# Patient Record
Sex: FEMALE | Race: WHITE | NOT HISPANIC OR LATINO | Employment: OTHER | URBAN - METROPOLITAN AREA
[De-identification: names, ages, dates, MRNs, and addresses within clinical notes are randomized per-mention and may not be internally consistent; named-entity substitution may affect disease eponyms.]

---

## 2017-09-13 PROBLEM — R06.02 SOB (SHORTNESS OF BREATH): Status: ACTIVE | Noted: 2017-09-13

## 2017-09-29 PROBLEM — I27.20 PULMONARY HTN: Status: ACTIVE | Noted: 2017-09-29

## 2017-11-29 PROBLEM — R06.02 SOB (SHORTNESS OF BREATH): Status: RESOLVED | Noted: 2017-09-13 | Resolved: 2017-11-29

## 2017-12-30 ENCOUNTER — NURSE TRIAGE (OUTPATIENT)
Dept: ADMINISTRATIVE | Facility: CLINIC | Age: 62
End: 2017-12-30

## 2017-12-31 NOTE — TELEPHONE ENCOUNTER
"    Reason for Disposition   General information question, no triage required and triager able to answer question    Answer Assessment - Initial Assessment Questions  1. REASON FOR CALL or QUESTION: "What is your reason for calling today?" or "How can I best help you?" or "What question do you have that I can help answer?"      Patient needs help with the c pap machine. It's not working and the customer service number transferred her to [facility] on Call. Advised patient to call the customer service number indicated on the machine. She verbalized understanding.    Protocols used: ST INFORMATION ONLY CALL-A-AH      "

## 2018-01-02 PROBLEM — I27.20 PULMONARY HTN: Status: RESOLVED | Noted: 2017-09-29 | Resolved: 2018-01-02

## 2018-07-02 PROBLEM — K29.70 GASTRITIS: Status: ACTIVE | Noted: 2017-12-21

## 2018-10-05 PROBLEM — R00.2 PALPITATIONS: Status: ACTIVE | Noted: 2018-10-05

## 2018-12-07 PROBLEM — R13.10 DYSPHAGIA: Status: ACTIVE | Noted: 2018-12-07

## 2021-02-04 PROBLEM — I63.9 CVA (CEREBRAL VASCULAR ACCIDENT): Status: ACTIVE | Noted: 2021-02-04

## 2021-02-05 PROBLEM — G51.31 HEMIFACIAL SPASM OF RIGHT SIDE OF FACE: Status: ACTIVE | Noted: 2021-02-05

## 2021-02-05 PROBLEM — I63.9 CVA (CEREBRAL VASCULAR ACCIDENT): Status: RESOLVED | Noted: 2021-02-04 | Resolved: 2021-02-05

## 2021-07-16 ENCOUNTER — TELEPHONE (OUTPATIENT)
Dept: NEUROSURGERY | Facility: CLINIC | Age: 66
End: 2021-07-16

## 2021-08-19 ENCOUNTER — CLINICAL SUPPORT (OUTPATIENT)
Dept: SMOKING CESSATION | Facility: CLINIC | Age: 66
End: 2021-08-19
Payer: COMMERCIAL

## 2021-08-19 DIAGNOSIS — F17.200 NICOTINE DEPENDENCE: Primary | ICD-10-CM

## 2021-08-19 PROCEDURE — 99404 PREV MED CNSL INDIV APPRX 60: CPT | Mod: S$GLB,,, | Performed by: GENERAL PRACTICE

## 2021-08-19 PROCEDURE — 99404 PR PREVENT COUNSEL,INDIV,60 MIN: ICD-10-PCS | Mod: S$GLB,,, | Performed by: GENERAL PRACTICE

## 2021-08-19 PROCEDURE — 99999 PR PBB SHADOW E&M-EST. PATIENT-LVL II: ICD-10-PCS | Mod: PBBFAC,,,

## 2021-08-19 PROCEDURE — 99999 PR PBB SHADOW E&M-EST. PATIENT-LVL II: CPT | Mod: PBBFAC,,,

## 2021-08-19 RX ORDER — IBUPROFEN 200 MG
1 TABLET ORAL DAILY
Qty: 28 PATCH | Refills: 0 | Status: SHIPPED | OUTPATIENT
Start: 2021-08-19 | End: 2022-07-28

## 2021-08-19 RX ORDER — DIPHENHYDRAMINE HCL 25 MG
CAPSULE ORAL
Qty: 100 EACH | Refills: 0 | Status: SHIPPED | OUTPATIENT
Start: 2021-08-19 | End: 2022-07-28

## 2021-09-09 ENCOUNTER — CLINICAL SUPPORT (OUTPATIENT)
Dept: SMOKING CESSATION | Facility: CLINIC | Age: 66
End: 2021-09-09
Payer: COMMERCIAL

## 2021-09-09 DIAGNOSIS — F17.200 NICOTINE DEPENDENCE: Primary | ICD-10-CM

## 2021-09-09 PROCEDURE — 99999 PR PBB SHADOW E&M-EST. PATIENT-LVL III: CPT | Mod: PBBFAC,,,

## 2021-09-09 PROCEDURE — 99402 PR PREVENT COUNSEL,INDIV,30 MIN: ICD-10-PCS | Mod: S$GLB,,, | Performed by: GENERAL PRACTICE

## 2021-09-09 PROCEDURE — 99402 PREV MED CNSL INDIV APPRX 30: CPT | Mod: S$GLB,,, | Performed by: GENERAL PRACTICE

## 2021-09-09 PROCEDURE — 99999 PR PBB SHADOW E&M-EST. PATIENT-LVL III: ICD-10-PCS | Mod: PBBFAC,,,

## 2021-09-23 ENCOUNTER — TELEPHONE (OUTPATIENT)
Dept: SMOKING CESSATION | Facility: CLINIC | Age: 66
End: 2021-09-23

## 2021-09-27 ENCOUNTER — TELEPHONE (OUTPATIENT)
Dept: SMOKING CESSATION | Facility: CLINIC | Age: 66
End: 2021-09-27

## 2021-10-05 ENCOUNTER — TELEPHONE (OUTPATIENT)
Dept: SMOKING CESSATION | Facility: CLINIC | Age: 66
End: 2021-10-05

## 2022-02-01 PROBLEM — I51.89 DIASTOLIC DYSFUNCTION: Status: ACTIVE | Noted: 2022-02-01

## 2022-06-28 PROBLEM — D69.2 OTHER NONTHROMBOCYTOPENIC PURPURA: Status: ACTIVE | Noted: 2022-06-28

## 2022-07-28 PROBLEM — J20.8 ACUTE BRONCHITIS DUE TO OTHER SPECIFIED ORGANISMS: Status: ACTIVE | Noted: 2022-07-28

## 2022-08-17 ENCOUNTER — CLINICAL SUPPORT (OUTPATIENT)
Dept: SMOKING CESSATION | Facility: CLINIC | Age: 67
End: 2022-08-17
Payer: COMMERCIAL

## 2022-08-17 DIAGNOSIS — F17.200 NICOTINE DEPENDENCE: Primary | ICD-10-CM

## 2022-08-17 PROCEDURE — 99407 PR TOBACCO USE CESSATION INTENSIVE >10 MINUTES: ICD-10-PCS | Mod: S$GLB,,,

## 2022-08-17 PROCEDURE — 99407 BEHAV CHNG SMOKING > 10 MIN: CPT | Mod: S$GLB,,,

## 2022-08-17 NOTE — PROGRESS NOTES
Spoke with patient today in regard to smoking cessation progress for 12 month phone follow up on quit 1. Patient not tobacco free at this time, but states she is smoking about 7 cpd.  Virtual appointments were discussed and help line number was given to help with instructions. Patient has scheduled an appointment to return to the program for Quit attempt #2. Informed patient of benefit period, future follow ups, and contact information if any further help or support is needed. Will complete smart form and resolve Quit attempt #1.

## 2022-08-31 ENCOUNTER — TELEPHONE (OUTPATIENT)
Dept: SMOKING CESSATION | Facility: CLINIC | Age: 67
End: 2022-08-31
Payer: MEDICARE

## 2022-08-31 NOTE — TELEPHONE ENCOUNTER
I called patient as she was no show for virtual tobacco cessation appointment but had to leave a message.

## 2022-09-20 ENCOUNTER — TELEPHONE (OUTPATIENT)
Dept: SMOKING CESSATION | Facility: CLINIC | Age: 67
End: 2022-09-20
Payer: MEDICARE

## 2022-09-28 ENCOUNTER — TELEPHONE (OUTPATIENT)
Dept: SMOKING CESSATION | Facility: CLINIC | Age: 67
End: 2022-09-28
Payer: MEDICARE

## 2022-10-05 PROBLEM — R09.89 CHEST CONGESTION: Status: ACTIVE | Noted: 2022-10-05

## 2022-10-05 PROBLEM — J20.9 ACUTE BRONCHITIS: Status: ACTIVE | Noted: 2022-10-05

## 2022-10-05 PROBLEM — J20.8 ACUTE BRONCHITIS DUE TO OTHER SPECIFIED ORGANISMS: Status: RESOLVED | Noted: 2022-07-28 | Resolved: 2022-10-05

## 2022-11-25 PROBLEM — K57.30 SIGMOID DIVERTICULOSIS: Status: ACTIVE | Noted: 2022-11-25

## 2022-11-25 PROBLEM — J20.9 ACUTE BRONCHITIS: Status: RESOLVED | Noted: 2022-10-05 | Resolved: 2022-11-25

## 2022-11-25 PROBLEM — U07.1 COVID-19 VIRUS INFECTION: Status: ACTIVE | Noted: 2022-11-25

## 2022-11-25 PROBLEM — Z86.010 HISTORY OF ADENOMATOUS POLYP OF COLON: Status: ACTIVE | Noted: 2022-11-25

## 2022-11-25 PROBLEM — E66.01 MORBID OBESITY WITH BMI OF 40.0-44.9, ADULT: Status: ACTIVE | Noted: 2022-11-25

## 2023-01-05 PROBLEM — E66.01 MORBID OBESITY WITH BMI OF 40.0-44.9, ADULT: Status: RESOLVED | Noted: 2022-11-25 | Resolved: 2023-01-05

## 2023-01-05 PROBLEM — E11.65 TYPE 2 DIABETES MELLITUS WITH HYPERGLYCEMIA, WITHOUT LONG-TERM CURRENT USE OF INSULIN: Status: ACTIVE | Noted: 2023-01-05

## 2023-01-05 PROBLEM — R09.89 CHEST CONGESTION: Status: RESOLVED | Noted: 2022-10-05 | Resolved: 2023-01-05

## 2023-01-05 PROBLEM — U07.1 COVID-19 VIRUS INFECTION: Status: RESOLVED | Noted: 2022-11-25 | Resolved: 2023-01-05

## 2023-01-05 PROBLEM — Z86.16 HISTORY OF 2019 NOVEL CORONAVIRUS DISEASE (COVID-19): Status: ACTIVE | Noted: 2023-01-05

## 2023-01-05 PROBLEM — E66.01 CLASS 2 SEVERE OBESITY DUE TO EXCESS CALORIES WITH SERIOUS COMORBIDITY IN ADULT: Status: ACTIVE | Noted: 2023-01-05

## 2023-01-05 PROBLEM — I67.2 ARTERIOSCLEROTIC CEREBROVASCULAR DISEASE: Status: ACTIVE | Noted: 2023-01-05

## 2023-01-05 PROBLEM — R41.3 SHORT-TERM MEMORY LOSS: Status: ACTIVE | Noted: 2023-01-05

## 2023-05-24 PROBLEM — E66.01 MORBID OBESITY WITH BMI OF 40.0-44.9, ADULT: Status: ACTIVE | Noted: 2023-05-24

## 2023-05-24 PROBLEM — E66.01 CLASS 2 SEVERE OBESITY DUE TO EXCESS CALORIES WITH SERIOUS COMORBIDITY IN ADULT: Status: RESOLVED | Noted: 2023-01-05 | Resolved: 2023-05-24

## 2023-05-24 PROBLEM — J20.9 ACUTE BRONCHITIS: Status: ACTIVE | Noted: 2023-05-24

## 2023-05-24 PROBLEM — R05.9 COUGH: Status: ACTIVE | Noted: 2023-05-24

## 2023-08-03 PROBLEM — M79.602 LEFT ARM PAIN: Status: ACTIVE | Noted: 2023-08-03

## 2023-08-03 PROBLEM — J02.9 SORE THROAT: Status: ACTIVE | Noted: 2023-08-03

## 2023-09-06 ENCOUNTER — TELEPHONE (OUTPATIENT)
Dept: PSYCHIATRY | Facility: CLINIC | Age: 68
End: 2023-09-06
Payer: MEDICARE

## 2023-09-06 NOTE — TELEPHONE ENCOUNTER
Lvm to get patient scheduled for NP appt with DR. LOZADA.     (Dr. Lozada agreed to see this patient for med man)     Available dates to offer as of right now (kevin approved these dates and times)   9/13 at 9 am or 1 pm   9/14 at 9 am   9/18 at 9 am   9/19 at 9 am or 11 am

## 2023-10-02 PROBLEM — M75.22 TENDINITIS OF LONG HEAD OF BICEPS BRACHII OF LEFT SHOULDER: Status: ACTIVE | Noted: 2023-10-02

## 2023-10-02 PROBLEM — M54.12 CERVICAL RADICULOPATHY: Status: ACTIVE | Noted: 2023-10-02

## 2023-10-19 PROBLEM — I50.32 CHRONIC DIASTOLIC HEART FAILURE: Status: ACTIVE | Noted: 2022-02-01

## 2023-10-19 PROBLEM — Z71.89 ACP (ADVANCE CARE PLANNING): Status: ACTIVE | Noted: 2023-10-19

## 2023-10-19 PROBLEM — L03.213 PRESEPTAL CELLULITIS: Status: ACTIVE | Noted: 2023-10-19

## 2023-10-19 PROBLEM — E66.2 CLASS 3 OBESITY WITH ALVEOLAR HYPOVENTILATION WITHOUT SERIOUS COMORBIDITY WITH BODY MASS INDEX (BMI) OF 40.0 TO 44.9 IN ADULT: Status: ACTIVE | Noted: 2023-10-19

## 2023-10-20 PROBLEM — E04.1 THYROID NODULE: Status: ACTIVE | Noted: 2023-10-20

## 2023-10-22 PROBLEM — J18.9 RIGHT MIDDLE LOBE PNEUMONIA: Status: ACTIVE | Noted: 2023-10-22

## 2023-10-23 PROBLEM — L03.213 PERIORBITAL CELLULITIS OF LEFT EYE: Status: ACTIVE | Noted: 2023-10-23

## 2023-10-23 PROBLEM — J20.9 ACUTE BRONCHITIS: Status: RESOLVED | Noted: 2023-05-24 | Resolved: 2023-10-23

## 2023-10-23 PROBLEM — L03.213 PRESEPTAL CELLULITIS: Status: RESOLVED | Noted: 2023-10-19 | Resolved: 2023-10-23

## 2024-01-22 PROBLEM — J18.9 RIGHT MIDDLE LOBE PNEUMONIA: Status: RESOLVED | Noted: 2023-10-22 | Resolved: 2024-01-22

## 2024-03-01 PROBLEM — J20.9 ACUTE BRONCHITIS: Status: ACTIVE | Noted: 2024-03-01

## 2024-03-01 PROBLEM — K29.70 GASTRITIS: Status: RESOLVED | Noted: 2017-12-21 | Resolved: 2024-03-01

## 2024-03-01 PROBLEM — J02.9 SORE THROAT: Status: RESOLVED | Noted: 2023-08-03 | Resolved: 2024-03-01

## 2024-03-01 PROBLEM — M75.22 TENDINITIS OF LONG HEAD OF BICEPS BRACHII OF LEFT SHOULDER: Status: RESOLVED | Noted: 2023-10-02 | Resolved: 2024-03-01

## 2024-03-01 PROBLEM — I65.23 BILATERAL CAROTID ARTERY STENOSIS: Status: ACTIVE | Noted: 2024-03-01

## 2024-03-01 PROBLEM — N64.4 BREAST PAIN, LEFT: Status: ACTIVE | Noted: 2024-03-01

## 2024-03-02 PROBLEM — R05.9 COUGH: Status: RESOLVED | Noted: 2023-05-24 | Resolved: 2024-03-02
